# Patient Record
Sex: FEMALE | Race: WHITE | NOT HISPANIC OR LATINO | Employment: OTHER | URBAN - METROPOLITAN AREA
[De-identification: names, ages, dates, MRNs, and addresses within clinical notes are randomized per-mention and may not be internally consistent; named-entity substitution may affect disease eponyms.]

---

## 2021-01-13 ENCOUNTER — *BOTOX/DYSPORT (OUTPATIENT)
Dept: URBAN - METROPOLITAN AREA CLINIC 36 | Facility: CLINIC | Age: 46
Setting detail: DERMATOLOGY
End: 2021-01-13

## 2021-01-13 DIAGNOSIS — B07.8 OTHER VIRAL WARTS: ICD-10-CM

## 2021-01-13 PROCEDURE — OTHER BOTOX COSMETIC - 2 AREAS *BD*: OTHER

## 2021-01-13 PROCEDURE — OTHER BOTOX VIAL # (100U): OTHER

## 2021-01-21 ENCOUNTER — *BOTOX/DYSPORT TOUCH-UP (OUTPATIENT)
Dept: URBAN - METROPOLITAN AREA CLINIC 36 | Facility: CLINIC | Age: 46
Setting detail: DERMATOLOGY
End: 2021-01-21

## 2021-01-21 DIAGNOSIS — Z41.9 ENCOUNTER FOR PROCEDURE FOR PURPOSES OTHER THAN REMEDYING HEALTH STATE, UNSPECIFIED: ICD-10-CM

## 2021-01-21 PROCEDURE — OTHER BOTOX VIAL # (100U): OTHER

## 2021-01-21 PROCEDURE — OTHER BOTOX COSMETIC - TOUCH-UP: OTHER

## 2021-06-09 ENCOUNTER — *BOTOX/DYSPORT (OUTPATIENT)
Dept: URBAN - METROPOLITAN AREA CLINIC 36 | Facility: CLINIC | Age: 46
Setting detail: DERMATOLOGY
End: 2021-06-09

## 2021-06-09 DIAGNOSIS — R68.89 OTHER GENERAL SYMPTOMS AND SIGNS: ICD-10-CM

## 2021-06-09 PROCEDURE — OTHER BOTOX VIAL # (100U): OTHER

## 2021-06-09 PROCEDURE — OTHER BOTOX COSMETIC - 3 AREAS *BD*: OTHER

## 2021-06-23 ENCOUNTER — *FILLER (OUTPATIENT)
Dept: URBAN - METROPOLITAN AREA CLINIC 36 | Facility: CLINIC | Age: 46
Setting detail: DERMATOLOGY
End: 2021-06-23

## 2021-06-23 DIAGNOSIS — L57.0 ACTINIC KERATOSIS: ICD-10-CM

## 2021-06-23 PROCEDURE — OTHER BOTOX COSMETIC - TOUCH-UP: OTHER

## 2021-06-23 PROCEDURE — OTHER BOTOX VIAL # (100U): OTHER

## 2021-10-27 ENCOUNTER — SEE NOTE (OUTPATIENT)
Dept: URBAN - METROPOLITAN AREA CLINIC 36 | Facility: CLINIC | Age: 46
Setting detail: DERMATOLOGY
End: 2021-10-27

## 2021-10-27 DIAGNOSIS — L82.0 INFLAMED SEBORRHEIC KERATOSIS: ICD-10-CM

## 2021-10-27 DIAGNOSIS — D22.5 MELANOCYTIC NEVI OF TRUNK: ICD-10-CM

## 2021-10-27 DIAGNOSIS — L72.3 SEBACEOUS CYST: ICD-10-CM

## 2021-10-27 PROCEDURE — OTHER COSMETIC TREATMENT: OTHER

## 2021-10-27 PROCEDURE — OTHER BOTOX VIAL # (100U): OTHER

## 2021-10-27 PROCEDURE — OTHER RESTYLANE L 1 ML: OTHER

## 2021-10-27 PROCEDURE — OTHER BOTOX COSMETIC - 3 AREAS *BD*: OTHER

## 2021-11-10 ENCOUNTER — *BOTOX/DYSPORT TOUCH-UP (OUTPATIENT)
Dept: URBAN - METROPOLITAN AREA CLINIC 36 | Facility: CLINIC | Age: 46
Setting detail: DERMATOLOGY
End: 2021-11-10

## 2021-11-10 DIAGNOSIS — L20.84 INTRINSIC (ALLERGIC) ECZEMA: ICD-10-CM

## 2021-11-10 DIAGNOSIS — D22.5 MELANOCYTIC NEVI OF TRUNK: ICD-10-CM

## 2021-11-10 PROCEDURE — OTHER BOTOX VIAL # (100U): OTHER

## 2021-11-10 PROCEDURE — OTHER BOTOX COSMETIC - TOUCH-UP: OTHER

## 2024-05-03 ENCOUNTER — OFFICE VISIT (OUTPATIENT)
Dept: OTOLARYNGOLOGY | Facility: CLINIC | Age: 49
End: 2024-05-03
Payer: COMMERCIAL

## 2024-05-03 VITALS — HEIGHT: 65 IN | BODY MASS INDEX: 30.76 KG/M2 | WEIGHT: 184.63 LBS

## 2024-05-03 DIAGNOSIS — Z80.8 FAMILY HISTORY OF THYROID CANCER: ICD-10-CM

## 2024-05-03 DIAGNOSIS — E04.1 THYROID NODULE: ICD-10-CM

## 2024-05-03 PROCEDURE — 99203 OFFICE O/P NEW LOW 30 MIN: CPT | Mod: S$GLB,,, | Performed by: OTOLARYNGOLOGY

## 2024-05-03 PROCEDURE — 3008F BODY MASS INDEX DOCD: CPT | Mod: CPTII,S$GLB,, | Performed by: OTOLARYNGOLOGY

## 2024-05-03 PROCEDURE — 99999 PR PBB SHADOW E&M-EST. PATIENT-LVL III: CPT | Mod: PBBFAC,,, | Performed by: OTOLARYNGOLOGY

## 2024-05-03 NOTE — PROGRESS NOTES
"Subjective:       Patient ID: Clari Nino is a 48 y.o. female.    Chief Complaint: Thyroid Problem (Pt c/o thyroid nodules that has gotten bigger. )      This patient has a history of a large nodule on the left and a couple of nodules throughout her thyroid that has been present since she lived in [facility] she had a nodule on the left "drained" and she recently had an ultrasound that showed several nodules that are of an appropriate size and characteristics for needle biopsy.  She tells me that she had genetic testing that shows she has a genetic abnormality that is of concern for familial breast cancer colon cancer and thyroid cancer.  She has a history of thyroid cancer in her mother.          Objective:      ENT Physical Exam    She has an easily palpable mass in her left thyroid it is nontender but she tells me it puts a lot of pressure on her throat when it is examined.  The nodule in this area is greater than 4 cm    She has had a septoplasty turbinate reduction and sinus surgery of some sort I do not have that is details but her nose looks great she tells me it is feeling much better since the surgery.        Assessment:       1. Thyroid nodule    2. Family history of thyroid cancer         Plan:          Given multiple nodules more than one of which is of an appropriate characteristic for needle biopsy, a familial history of thyroid cancer, recent genetic testing that she does not have the details of and I can not find in the chart but allude to a potential familial correlation with thyroid cancer I think she probably just needs a thyroidectomy or at least a hemithyroidectomy on the left.  As I have told her I do not do that procedure currently but she has seen Dr. Gan before who helped her out with sinus and nasal surgery and I have recommended that she make her way to his office and see what he thinks about this probably scheduling a thyroidectomy.        "

## 2024-05-06 ENCOUNTER — HOSPITAL ENCOUNTER (OUTPATIENT)
Dept: RADIOLOGY | Facility: HOSPITAL | Age: 49
Discharge: HOME | End: 2024-05-06
Attending: PHYSICIAN ASSISTANT
Payer: COMMERCIAL

## 2024-05-06 PROCEDURE — 77063 BREAST TOMOSYNTHESIS BI: CPT | Mod: 26,,, | Performed by: RADIOLOGY

## 2024-05-06 PROCEDURE — 77067 SCR MAMMO BI INCL CAD: CPT | Mod: 26,,, | Performed by: RADIOLOGY

## 2024-05-06 PROCEDURE — 76641 ULTRASOUND BREAST COMPLETE: CPT | Mod: 26,50,, | Performed by: RADIOLOGY

## 2024-05-13 ENCOUNTER — TELEPHONE (OUTPATIENT)
Dept: GASTROENTEROLOGY | Facility: CLINIC | Age: 49
End: 2024-05-13
Payer: COMMERCIAL

## 2024-05-13 ENCOUNTER — OFFICE VISIT (OUTPATIENT)
Dept: SURGERY | Facility: CLINIC | Age: 49
End: 2024-05-13
Payer: COMMERCIAL

## 2024-05-13 VITALS
HEIGHT: 65 IN | SYSTOLIC BLOOD PRESSURE: 151 MMHG | BODY MASS INDEX: 30.66 KG/M2 | DIASTOLIC BLOOD PRESSURE: 98 MMHG | HEART RATE: 79 BPM | WEIGHT: 184 LBS

## 2024-05-13 DIAGNOSIS — Z15.89 CHEK2 GENE MUTATION POSITIVE: ICD-10-CM

## 2024-05-13 DIAGNOSIS — Z12.39 ENCOUNTER FOR BREAST CANCER SCREENING OTHER THAN MAMMOGRAM: ICD-10-CM

## 2024-05-13 DIAGNOSIS — Z12.39 SCREENING BREAST EXAMINATION: ICD-10-CM

## 2024-05-13 DIAGNOSIS — Z91.89 AT HIGH RISK FOR BREAST CANCER: Primary | ICD-10-CM

## 2024-05-13 PROCEDURE — 3008F BODY MASS INDEX DOCD: CPT | Mod: CPTII,S$GLB,, | Performed by: PHYSICIAN ASSISTANT

## 2024-05-13 PROCEDURE — 3077F SYST BP >= 140 MM HG: CPT | Mod: CPTII,S$GLB,, | Performed by: PHYSICIAN ASSISTANT

## 2024-05-13 PROCEDURE — 99999 PR PBB SHADOW E&M-EST. PATIENT-LVL III: CPT | Mod: PBBFAC,,, | Performed by: PHYSICIAN ASSISTANT

## 2024-05-13 PROCEDURE — 3080F DIAST BP >= 90 MM HG: CPT | Mod: CPTII,S$GLB,, | Performed by: PHYSICIAN ASSISTANT

## 2024-05-13 PROCEDURE — 1159F MED LIST DOCD IN RCRD: CPT | Mod: CPTII,S$GLB,, | Performed by: PHYSICIAN ASSISTANT

## 2024-05-13 PROCEDURE — 99204 OFFICE O/P NEW MOD 45 MIN: CPT | Mod: S$GLB,,, | Performed by: PHYSICIAN ASSISTANT

## 2024-05-13 NOTE — PROGRESS NOTES
[facility] [place] [facility] [place]  Department of Surgery  HIGH RISK      Referring provider:  Nia Anthony, NP-C  [address] [id number]    PCP:  Melany, Primary Doctor    HIGH RISK    Subjective:     Clari Nino is a 48 y.o. 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 Patient has a family history with a paternal GF with breast cancer. Here today to discussed options of high risk screening and risk reduction.    Patient does admit to a left breast mass that is palpable. She was told at [facility] this is a benign fibroadenoma. Based on [facility] Mammo Interp on 24 showed multiple similar benign-appearing masses scattered in both breasts which are consistent with benign fibroadenomas. Denies significant pain associated with largest mass. Denies other new breast changes such as skin changes or nipple discharge.       Gynecologic History:  Age of menarche was 15  Premenopausal.   Patient is .    Age of first live birth was 34.      Medical History is significant for the following:  Past Medical History:   Diagnosis Date    Depression     Thyroid disease        Family History is significant for the following:  No family history on file.    Social History:   Social History     Socioeconomic History    Marital status:    Tobacco Use    Smoking status: Never    Smokeless tobacco: Never   Substance and Sexual Activity    Alcohol use: Yes    Drug use: Never    Sexual activity: Yes     Partners: Male     Birth control/protection: Partner-Vasectomy       Review of Systems  Review of Systems   Constitutional:  Negative for chills, fever and malaise/fatigue.   HENT:  Negative for congestion.    Eyes:  Negative for discharge.   Respiratory:  Negative for cough, shortness of breath and stridor.    Cardiovascular:  Negative for chest pain and palpitations.   Gastrointestinal:  Negative for  "abdominal pain and nausea.   Neurological:  Negative for headaches.   Psychiatric/Behavioral:  The patient is not nervous/anxious.           Objective:   BP (!) 151/98   Pulse 79   Ht 5' 5" (1.651 m)   Wt 83.5 kg (184 lb)   BMI 30.62 kg/m²     Physical Exam   Vitals reviewed.  Constitutional: She is oriented to person, place, and time.   HENT:   Head: Normocephalic and atraumatic.   Nose: Nose normal.   Eyes: Pupils are equal, round, and reactive to light. Right eye exhibits no discharge. Left eye exhibits no discharge.   Pulmonary/Chest: Effort normal and breath sounds normal. No stridor. No respiratory distress. She exhibits no mass, no tenderness and no edema. Right breast exhibits no inverted nipple, no mass, no nipple discharge, no skin change and no tenderness. Left breast exhibits no inverted nipple, no mass, no nipple discharge, no skin change and no tenderness. No breast swelling or bleeding. Breasts are symmetrical.   Abdominal: Normal appearance.   Genitourinary: No breast swelling or bleeding.   Neurological: She is alert and oriented to person, place, and time.   Skin: Skin is warm and dry.     Psychiatric: Her behavior is normal. Mood, judgment and thought content normal.       Imaging    5/6/24 [facility] Mammo Interp:    Findings:  Outside mammogram:   The breasts are heterogeneously dense, which may obscure small masses.     There are multiple similar oval and benign-appearing masses scattered in both breasts.  This is a known benign mammographic pattern.  No suspicious mammographic finding in either breast.       Outside ultrasound:   No distance from the nipple given for any of the breast ultrasound findings.      In the right breast 09:00 o'clock axis, there is an oval parallel hypoechoic mass with circumscribed margins measuring 1.7 cm long axis.       In the right breast 10:00 o'clock axis, there is an oval parallel hypoechoic mass with circumscribed margins measuring 2.5 x 1.8 x 2.4 cm.     In " the right breast 11:00 o'clock axis, there is an oval parallel hypoechoic mass with circumscribed margins measuring 1.5 cm.     In the left breast 01:00 o'clock axis, there is an oval parallel hypoechoic mass with circumscribed margins measuring 3.9 x 2.8 x 2.9 cm.       In the left breast 06:00 o'clock axis, there is an oval parallel hypoechoic mass with circumscribed margins measuring 0.6 cm long axis.       In the left breast 10:00 o'clock axis, there is an oval parallel hypoechoic mass with circumscribed margins measuring 2.6 x 1.5 x 1.9 cm.     The bilateral breast oval masses are all consistent with benign fibroadenomas.  Multiple similar-appearing oval masses scattered in both breasts is a known benign pattern.     There are several simple cysts scattered in both breasts, also benign.     Impression:  No suspicious finding in either breast.  Multiple bilateral oval similar-appearing masses is a known benign breast pattern.     BI-RADS Category:   Overall: 2 - Benign     Recommendation:  Annual screening mammography, next due February 2025.     Assessment:     This is a 48 y.o. female with an increased risk of breast cancer based on known CHEK2 mutation.     Plan:   Discussed risk models can vary based on the model used.  There are several models available and we currently use TC model for our patients with family history.  Based on this, the patient's risk exceeds 20%. Patients with lifetime risk over 20% qualify for increased screening with MRI, in addition to mammograms.  We also would perform clinical breast exams every 6 months.  Discussed pros and cons of MRI screening.    We also discussed risk reduction options such as a healthy diet including fresh fruits, green leafy vegetables and lean meats. Avoidance of processed foods.    Exercise I recommend at least 30 minutes of cardiovascular exercise 4-5 times per week, even walking would have benefit.  Discussed that exercise can lower the relative risk of  breast cancer by about 18-20%.  Also discussed that obesity is linked to higher risk of breast cancer, therefore exercise in important.    Discussed alcohol use and some studies suggest that increase alcohol intake may increase breast cancer risk.  Therefore, I do recommend limited alcohol intake.    Also discussed risk factors that are not modifiable, such as age at menarche, age at menopause, age at first pregnancy, and family history.     We did discuss hormone replacement therapy as well.  In patients at increased risk, I usually do not recommend HRT for long periods of time.      Discussed briefly risk reduction options with chemoprevention, such as Tamoxifen or Raloxifene.  These have been shown to lower the risk of breast cancer incidence, however have not been shown to improve survival in patients who do not have a breast cancer.    Return to clinic in 6 months for Clinical Breast Exam. Will alternate Mammogram and MRI every 6 months.     Next MRI due in July. Will schedule today. Next mammogram due in February 2025. Will see back at that time.     The patient is in agreement with the plan. Questions were encouraged and answered to patient's satisfaction. Clari will call our office with any questions or concerns.

## 2024-05-13 NOTE — TELEPHONE ENCOUNTER
----- Message from Magalie Guaman sent at 5/13/2024  3:52 PM CDT -----  Regarding: RE: appt assistance  Plz ctc pt to discuss.     Thank you,  Magalie Guaman  ----- Message -----  From: Ofelia Hinkle LPN  Sent: 5/13/2024   3:42 PM CDT  To: Magalie Guaman  Subject: RE: appt assistance                              Thank you for letting us know. This patient can see our nurse practitioner if she would like.  ----- Message -----  From: Magalie Guaman  Sent: 5/13/2024   8:53 AM CDT  To: [facility] Staff  Subject: appt assistance                                  FRANKIE BOUCHER Is referring this pt to see you for CHEK2 gene mutation positive [Z15.89]    Can you assist this pt? If so, plz ctc to schedule appt or advise otherwise.     Thank you,  Magalie Guaman   [facility]

## 2024-07-29 ENCOUNTER — TELEPHONE (OUTPATIENT)
Dept: GASTROENTEROLOGY | Facility: CLINIC | Age: 49
End: 2024-07-29
Payer: COMMERCIAL

## 2024-07-29 NOTE — TELEPHONE ENCOUNTER
----- Message from Vincent Otoole sent at 7/29/2024  8:30 AM CDT -----  Good morning,     The appointment on 08/01/24 was canceled. Please contact the patient to reschedule.     Thank You,     [facility] O[facility]  [facility]

## 2024-07-29 NOTE — TELEPHONE ENCOUNTER
Called patient to follow up on rescheduling patient's appointment with [facility]; however, patient states that she will need to reschedule MRI in 2 weeks after her surgery recovery and then will call us back to schedule appt. Provided telephone number to patient to reschedule MRI.  No further action required at this point.